# Patient Record
Sex: MALE | Race: WHITE | Employment: FULL TIME | URBAN - METROPOLITAN AREA
[De-identification: names, ages, dates, MRNs, and addresses within clinical notes are randomized per-mention and may not be internally consistent; named-entity substitution may affect disease eponyms.]

---

## 2018-03-22 ENCOUNTER — OFFICE VISIT (OUTPATIENT)
Dept: INFECTIOUS DISEASES | Age: 43
End: 2018-03-22
Payer: COMMERCIAL

## 2018-03-22 VITALS
HEART RATE: 62 BPM | SYSTOLIC BLOOD PRESSURE: 115 MMHG | DIASTOLIC BLOOD PRESSURE: 74 MMHG | HEIGHT: 71 IN | OXYGEN SATURATION: 100 % | WEIGHT: 215.2 LBS | RESPIRATION RATE: 16 BRPM | BODY MASS INDEX: 30.13 KG/M2 | TEMPERATURE: 97.2 F

## 2018-03-22 DIAGNOSIS — A51.5: ICD-10-CM

## 2018-03-22 PROBLEM — Z12.11 SCREENING FOR COLON CANCER: Status: ACTIVE | Noted: 2018-02-22

## 2018-03-22 PROBLEM — K62.5 RECTAL BLEEDING: Status: ACTIVE | Noted: 2018-02-01

## 2018-03-22 PROCEDURE — 96372 THER/PROPH/DIAG INJ SC/IM: CPT | Performed by: INTERNAL MEDICINE

## 2018-03-22 PROCEDURE — 99204 OFFICE O/P NEW MOD 45 MIN: CPT | Performed by: INTERNAL MEDICINE

## 2018-03-25 PROBLEM — A51.5: Status: ACTIVE | Noted: 2018-03-25

## 2018-03-28 ENCOUNTER — TELEPHONE (OUTPATIENT)
Dept: INFECTIOUS DISEASES | Age: 43
End: 2018-03-28

## 2018-04-18 ENCOUNTER — OFFICE VISIT (OUTPATIENT)
Dept: INFECTIOUS DISEASES | Age: 43
End: 2018-04-18
Payer: COMMERCIAL

## 2018-04-18 VITALS
RESPIRATION RATE: 16 BRPM | TEMPERATURE: 97.6 F | DIASTOLIC BLOOD PRESSURE: 65 MMHG | HEIGHT: 71 IN | HEART RATE: 54 BPM | WEIGHT: 216 LBS | BODY MASS INDEX: 30.24 KG/M2 | SYSTOLIC BLOOD PRESSURE: 108 MMHG

## 2018-04-18 DIAGNOSIS — A51.5: Primary | ICD-10-CM

## 2018-04-18 PROCEDURE — 99213 OFFICE O/P EST LOW 20 MIN: CPT | Performed by: INTERNAL MEDICINE

## 2018-04-21 PROBLEM — Z12.11 SCREENING FOR COLON CANCER: Status: RESOLVED | Noted: 2018-02-22 | Resolved: 2018-04-21

## 2018-05-08 DIAGNOSIS — A51.5: ICD-10-CM

## 2018-05-10 ENCOUNTER — TELEPHONE (OUTPATIENT)
Dept: INFECTIOUS DISEASES | Age: 43
End: 2018-05-10

## 2018-05-10 DIAGNOSIS — A53.9 SYPHILIS: Primary | ICD-10-CM

## 2018-05-14 ENCOUNTER — TELEPHONE (OUTPATIENT)
Dept: INFECTIOUS DISEASES | Age: 43
End: 2018-05-14

## 2018-05-17 ENCOUNTER — OFFICE VISIT (OUTPATIENT)
Dept: INFECTIOUS DISEASES | Age: 43
End: 2018-05-17
Payer: COMMERCIAL

## 2018-05-17 VITALS
TEMPERATURE: 97.6 F | BODY MASS INDEX: 30.49 KG/M2 | OXYGEN SATURATION: 100 % | WEIGHT: 213 LBS | HEART RATE: 54 BPM | HEIGHT: 70 IN | SYSTOLIC BLOOD PRESSURE: 111 MMHG | DIASTOLIC BLOOD PRESSURE: 70 MMHG | RESPIRATION RATE: 16 BRPM

## 2018-05-17 DIAGNOSIS — A53.0 LATENT SYPHILIS: Primary | ICD-10-CM

## 2018-05-17 PROCEDURE — 99212 OFFICE O/P EST SF 10 MIN: CPT | Performed by: INTERNAL MEDICINE

## 2018-07-27 ENCOUNTER — TELEPHONE (OUTPATIENT)
Dept: INFECTIOUS DISEASES | Age: 43
End: 2018-07-27

## 2018-09-13 ENCOUNTER — OFFICE VISIT (OUTPATIENT)
Dept: INFECTIOUS DISEASES | Age: 43
End: 2018-09-13
Payer: COMMERCIAL

## 2018-09-13 VITALS
HEART RATE: 67 BPM | SYSTOLIC BLOOD PRESSURE: 118 MMHG | TEMPERATURE: 98.8 F | BODY MASS INDEX: 30.66 KG/M2 | DIASTOLIC BLOOD PRESSURE: 72 MMHG | WEIGHT: 219 LBS | HEIGHT: 71 IN

## 2018-09-13 DIAGNOSIS — A53.9 SYPHILIS: Primary | ICD-10-CM

## 2018-09-13 DIAGNOSIS — A51.5: ICD-10-CM

## 2018-09-13 PROCEDURE — 99213 OFFICE O/P EST LOW 20 MIN: CPT | Performed by: INTERNAL MEDICINE

## 2018-09-14 NOTE — PROGRESS NOTES
[facility]    Follow-up NOTE           Visit date: 9/13/2018      Reason for visit /chief complaints   SYPHILIS    Assessment:      Diagnosis Orders   1. Syphilis  Miscellaneous Sendout 1    HIV 1/2 AB Multispot   2. Latent early syphilis             Plan:     Status post penicillin  Last RPR titer was 1:64 improved from 1:128  Repeat RPR titer   Check HIV antibody      HPI:    Patient is 77-year-old male came in for follow-up. I'm following him for latent syphilis. He was given penicillin treatment and I'm following him for his RPR titer. His last RPR titer showed improvement. He denies any fever or chills. No cough or shortness of breath or no vomiting or diarrhea. No memory issues. Past Medical History:   Diagnosis Date    Back pain     Latent early syphilis     Latent syphilis     Migraine     Rectal bleeding     Visual impairment      Past Surgical History:   Procedure Laterality Date    COLONOSCOPY       Social History     Social History    Marital status:      Spouse name: N/A    Number of children: N/A    Years of education: N/A     Social History Main Topics    Smoking status: Never Smoker    Smokeless tobacco: Never Used    Alcohol use No    Drug use: Unknown    Sexual activity: Not Asked     Other Topics Concern    None     Social History Narrative    None     Family History   Problem Relation Age of Onset    Early Death Brother     Alcohol Abuse Father     Arthritis Father     Arthritis Mother     Breast Cancer Mother     Diabetes Mother     Arthritis Sister     Depression Sister     Diabetes Sister        Current med   No current outpatient prescriptions on file.      Review of Systems:  CONSTITUTIONAL:  negative  EYES:  negative  HEENT:  negative  RESPIRATORY:  negative  CARDIOVASCULAR:  negative  GASTROINTESTINAL:  negative  GENITOURINARY:  negative  INTEGUMENT/BREAST:  negative  HEMATOLOGIC/LYMPHATIC:  negative  ALLERGIC/IMMUNOLOGIC:

## 2018-10-10 DIAGNOSIS — A53.9 SYPHILIS: ICD-10-CM

## 2018-10-16 ENCOUNTER — TELEPHONE (OUTPATIENT)
Dept: INFECTIOUS DISEASES | Age: 43
End: 2018-10-16

## 2018-10-16 DIAGNOSIS — A53.9 SYPHILIS: Primary | ICD-10-CM

## 2018-10-17 NOTE — TELEPHONE ENCOUNTER
I called patient and left a message on machine asking for a call back. Our phone number was provided.

## 2018-12-11 DIAGNOSIS — A53.9 SYPHILIS: ICD-10-CM

## 2018-12-19 ENCOUNTER — TELEPHONE (OUTPATIENT)
Dept: INFECTIOUS DISEASES | Age: 43
End: 2018-12-19

## 2018-12-19 DIAGNOSIS — A53.9 SYPHILIS: Primary | ICD-10-CM

## 2018-12-19 NOTE — TELEPHONE ENCOUNTER
Test results improved, I ordered another one for follow-up , please asked patient to do it in February